# Patient Record
Sex: MALE | Race: OTHER
[De-identification: names, ages, dates, MRNs, and addresses within clinical notes are randomized per-mention and may not be internally consistent; named-entity substitution may affect disease eponyms.]

---

## 2018-09-08 ENCOUNTER — HOSPITAL ENCOUNTER (INPATIENT)
Dept: HOSPITAL 74 - YASAS | Age: 53
LOS: 5 days | Discharge: HOME | DRG: 773 | End: 2018-09-13
Attending: INTERNAL MEDICINE | Admitting: INTERNAL MEDICINE
Payer: COMMERCIAL

## 2018-09-08 VITALS — BODY MASS INDEX: 18.8 KG/M2

## 2018-09-08 DIAGNOSIS — K08.89: ICD-10-CM

## 2018-09-08 DIAGNOSIS — F10.230: ICD-10-CM

## 2018-09-08 DIAGNOSIS — F11.23: Primary | ICD-10-CM

## 2018-09-08 DIAGNOSIS — Z91.013: ICD-10-CM

## 2018-09-08 DIAGNOSIS — R63.6: ICD-10-CM

## 2018-09-08 DIAGNOSIS — F17.213: ICD-10-CM

## 2018-09-08 DIAGNOSIS — F19.282: ICD-10-CM

## 2018-09-08 LAB
APPEARANCE UR: CLEAR
BILIRUB UR STRIP.AUTO-MCNC: NEGATIVE MG/DL
COLOR UR: YELLOW
EPITH CASTS URNS QL MICRO: (no result) /HPF
KETONES UR QL STRIP: NEGATIVE
LEUKOCYTE ESTERASE UR QL STRIP.AUTO: NEGATIVE
MUCOUS THREADS URNS QL MICRO: (no result)
NITRITE UR QL STRIP: NEGATIVE
PH UR: 5 [PH] (ref 5–8)
PROT UR QL STRIP: NEGATIVE
PROT UR QL STRIP: NEGATIVE
SP GR UR: 1.02 (ref 1–1.03)
UROBILINOGEN UR STRIP-MCNC: NEGATIVE MG/DL (ref 0.2–1)

## 2018-09-08 PROCEDURE — HZ2ZZZZ DETOXIFICATION SERVICES FOR SUBSTANCE ABUSE TREATMENT: ICD-10-PCS | Performed by: INTERNAL MEDICINE

## 2018-09-08 RX ADMIN — Medication SCH MG: at 22:46

## 2018-09-08 NOTE — HP
COWS





- Scale


Resting Pulse: 0= [place] 80 or Below


Sweatin= Chills/Flushing


Restless Observation: 0= Sits Still


Pupil Size: 0= Normal to Room Light


Bone or Joint Aches: 1= Mild Discomfort


Runny Nose/ Eye Tearin= Nasal Congestion


GI Upset > 30mins: 1= Stomach Cramp


Tremor Observation: 2= Slight Tremor Visible


Yawning Observation: 1= 1-2x During Session


Anxiety or Irritability: 2=Irritable/Anxious


Goose Flesh Skin: 0=Smooth Skin


COWS Score: 9





CIWA Score





- CIWA Score


Nausea/Vomitin-Mild Nausea/No Vomiting


Muscle Tremors: 4-Moderate,w/Arms Extend


Anxiety: 4-Mod. Anxious/Guarded


Agitation: 1-Slight > Activity


Paroxysmal Sweats: 1-Minimal Palms Moist


Orientation: 1-Uncertain about Date


Tacttile Disturbances: 1-Very Mild Itch/Numbness


Auditory Disturbances: 1-Very Mild


Visual Disturbances: 1-Very Mild Sensitivity


Headache: 2-Mild


CIWA-Ar Total Score: 17





Admission ROS S





- HPI


Chief Complaint: 


I do too much drugs, I get too sick, I can't stop


Allergies/Adverse Reactions: 


 Allergies











Allergy/AdvReac Type Severity Reaction Status Date / Time


 


Fish Containing Products Allergy Mild Hives Verified 18 10:35











History of Present Illness: 


54 yo gentleman here for detox from opiates and alcohol.  Last time here in 

.  Denies seizures or black outs.  


Exam Limitations: Clinical Condition





- Ebola screening


Have you traveled outside of the country in the last 21 days: No (N)


Have you had contact with anyone from an Ebola affected area: No


Do you have a fever: No





- Review of Systems


Constitutional: Loss of Appetite, Malaise, Changes in sleep, Weakness


EENT: reports: Nose Congestion


Respiratory: reports: No Symptoms reported


Cardiac: reports: No Symptoms Reported


GI: reports: Poor Appetite, Poor Fluid Intake, Indigestion, Abdominal cramping


: reports: Frequency


Musculoskeletal: reports: Back Pain, Muscle Pain


Integumentary: reports: No Symptoms Reported


Neuro: reports: Headache, Tremors


Endocrine: reports: No Symptoms Reported


Hematology: reports: No Symptoms Reported


Psychiatric: reports: Judgement Intact, Mood/Affect Appropiate, Anxious


Other Systems: Reviewed and Negative





Patient History





- Patient Medical History


Hx Asthma: No


Hx Chronic Obstructive Pulmonary Disease (COPD): No


Hx Cancer: No


Hx Congestive Heart Failure: No


Hx Hypertension: No


Hx Hypercholesterolemia: No


Hx Pacemaker: No


Hx Seizures: No


Hx Diabetes: No


Hx Gastrointestinal Disorders: No


Hx Liver Disease: No


Hx Genitourinary Disorders: No


Hx Sexually Transmitted Disorders: No


Hx Renal Disease (ESRD): No


Hx Thyroid Disease: No


Hx Human Immunodeficiency Virus (HIV): No


Hx Hepatitis C: No


Hx Depression: No


Hx Suicide Attempt: No


Hx Bipolar Disorder: No


Hx Schizophrenia: No





- Patient Surgical History


Past Surgical History: No





- PPD History


Previous Implant?: Yes


Documented Results: Negative w/o proof


Implanted On Prior [facility] Admission?: No


PPD to be Administered?: Yes





- Reproductive History


Patient is a Female of Child Bearing Age (11 -55 yrs old): No (male)





- Smoking Cessation


Smoking history: Current every day smoker


Have you smoked in the past 12 months: Yes


Aproximately how many cigarettes per day: 20


Initiated information on smoking cessation: Yes


'Breaking Loose' booklet given: 18





- Substance & Tx. History


Hx Alcohol Use: Yes


Hx Substance Use: Yes


Substance Use Type: Alcohol, Heroin


Hx Substance Use Treatment: Yes (detox)





- Substances Abused


  ** Heroin


Route: Inhalation


Frequency: Daily


Amount used: 6 bags


Age of first use: 15


Date of Last Use: 18





  ** Alcohol


Route: Oral


Frequency: Daily


Amount used: Beer - four 12 oz; 1/2 pint vodka


Age of first use: 15


Date of Last Use: 18





Family Disease History





- Family Disease History


Family Disease History: Heart Disease: Mother (), Other: Father (

 in [place]), Mother, Brother (13 brothers - in [place] - no contact), Sister (

four sisters - in [place] - no contact)





Admission Physical Exam [facility]





- Vital Signs


Vital Signs: 


123/76  P 59  R 18  T 96.2








- Physical


General Appearance: Yes: Appropriately Dressed, Moderate Distress, Thin, Anxious

, Other (poor dentition)


HEENTM: Yes: EOMI, Hearing grossly Normal, Normocephalic, Normal Voice, Pharynx 

Normal, Nasal Congestion


Respiratory: Yes: Normal Breath Sounds, No Respiratory Distress


Neck: Yes: No masses,lesions,Nodules, Supple


Breast: Yes: Breast Exam Deferred


Cardiology: Yes: Regular Rhythm, Bradycardia


Abdominal: Yes: Flat, Soft


Genitourinary: Yes: Within Normal Limits


Back: Yes: Normal Inspection


Musculoskeletal: Yes: full range of Motion, Gait Steady, Back pain, Muscle Pain


Extremities: Yes: Normal Inspection, Normal Range of Motion, Non-Tender


Neurological: Yes: Alert, Motor Strength 5/5, Normal Mood/Affect, Normal 

Response


Integumentary: Yes: Normal Color, Dry, Warm


Lymphatic: Yes: Within Normal Limits





- Diagnostic


(1) Alcohol dependence with uncomplicated withdrawal


Current Visit: Yes   Status: Chronic   





(2) Opioid dependence with withdrawal


Current Visit: Yes   Status: Chronic   





(3) Nicotine dependence


Current Visit: Yes   Status: Chronic   


Qualifiers: 


   Nicotine product type: cigarettes 





(4) Poor dentition


Current Visit: Yes   Status: Chronic   





(5) Underweight


Current Visit: Yes   Status: Chronic   





Cleared for Admission S





- Detox or Rehab


[facility] Level of Care: Medically Managed


Detox Regimen/Protocol: Methadone/Librium

## 2018-09-09 LAB
ALBUMIN SERPL-MCNC: 3.2 G/DL (ref 3.4–5)
ALP SERPL-CCNC: 80 U/L (ref 45–117)
ALT SERPL-CCNC: 16 U/L (ref 12–78)
ANION GAP SERPL CALC-SCNC: 11 MMOL/L (ref 8–16)
AST SERPL-CCNC: 19 U/L (ref 15–37)
BILIRUB SERPL-MCNC: 0.6 MG/DL (ref 0.2–1)
BUN SERPL-MCNC: 11 MG/DL (ref 7–18)
CALCIUM SERPL-MCNC: 9.1 MG/DL (ref 8.5–10.1)
CHLORIDE SERPL-SCNC: 107 MMOL/L (ref 98–107)
CO2 SERPL-SCNC: 24 MMOL/L (ref 21–32)
CREAT SERPL-MCNC: 0.8 MG/DL (ref 0.7–1.3)
DEPRECATED RDW RBC AUTO: 15.1 % (ref 11.9–15.9)
GLUCOSE SERPL-MCNC: 104 MG/DL (ref 74–106)
HCT VFR BLD CALC: 43.3 % (ref 35.4–49)
HGB BLD-MCNC: 14 GM/DL (ref 11.7–16.9)
MCH RBC QN AUTO: 29.2 PG (ref 25.7–33.7)
MCHC RBC AUTO-ENTMCNC: 32.5 G/DL (ref 32–35.9)
MCV RBC: 89.8 FL (ref 80–96)
PLATELET # BLD AUTO: 339 K/MM3 (ref 134–434)
PMV BLD: 8 FL (ref 7.5–11.1)
POTASSIUM SERPLBLD-SCNC: 4.4 MMOL/L (ref 3.5–5.1)
PROT SERPL-MCNC: 6.6 G/DL (ref 6.4–8.2)
RBC # BLD AUTO: 4.82 M/MM3 (ref 4–5.6)
SODIUM SERPL-SCNC: 142 MMOL/L (ref 136–145)
WBC # BLD AUTO: 9 K/MM3 (ref 4–10)

## 2018-09-09 RX ADMIN — Medication SCH MG: at 23:16

## 2018-09-09 RX ADMIN — Medication SCH TAB: at 10:20

## 2018-09-09 NOTE — CONSULT
[facility] Psychiatric Consult





- Data


Date of interview: 09/09/18


Admission source: Self-referred


Identifying data: Mr Andrade is a 53 years old single ,, father of a 

26 years old daughter, unemployed on public assistance, homeless seeking detox 

treatment for alcohol and opioid


Substance Abuse History: Reports history of alcohol and heroin use. Refer to 

addiction counselor's t[place] for further information


Medical History: Significant for bronchial asthma. Smokes cigarettes 1 ppd


Psychiatric History: Denies history of previous psychiatric treatment


Physical/Sexual Abuse/Trauma History: Denies history of emotional, physical or 

sexual abuse as well as DV relationship


Additional Comment: Reports history of 5 previous arrests including 3 felony 

convictions. Denies being on parole/probation at present





Mental Status Exam





- Mental Status Exam


Alert and Oriented to: Time, Place, Person


Cognitive Function: Fair


Patient Appearance: Well Groomed


Mood: Hopeful, Euthymic


Patient Behavior: Cooperative


Speech Pattern: Clear


Voice Loudness: Normal


Thought Process: Intact, Goal Oriented


Thought Disorder: Not Present


Hallucinations: Denies


Suicidal Ideation: Denies


Homicidal Ideation: Denies


Insight/Judgement: Poor


Sleep: Poorly


Appetite: Good


Muscle strength/Tone: Normal


Gait/Station: Normal





Psychiatric Findings





- Problem List (Axis 1, 2,3)


(1) Substance-induced sleep disorder


Current Visit: Yes   Status: Acute   





(2) Alcohol dependence with uncomplicated withdrawal


Current Visit: Yes   Status: Acute   





(3) Opioid dependence with withdrawal


Current Visit: Yes   Status: Acute   





(4) Nicotine dependence


Current Visit: Yes   Status: Chronic   


Qualifiers: 


   Nicotine product type: cigarettes 





- Initial Treatment Plan


Initial Treatment Plan: 1) Start Ambien 10 mg po HS prn for insomnia.  2) 

Continue inpatient detoxification

## 2018-09-09 NOTE — PN
[facility] CIWA





- CIWA Score


Nausea/Vomitin-No Nausea/No Vomiting


Muscle Tremors: 4-Moderate,w/Arms Extend


Anxiety: 4-Mod. Anxious/Guarded


Agitation: 3


Paroxysmal Sweats: 1-Minimal Palms Moist


Orientation: 1-Uncertain about Date


Tacttile Disturbances: 0-None


Auditory Disturbances: 0-None


Visual Disturbances: 0-None


Headache: 0-None Present


CIWA-Ar Total Score: 13





[facility] COWS





- Scale


Resting Pulse: 0= [place] 80 or Below


Sweatin= Chills/Flushing


Restless Observation: 1= Difficult to Sit Still


Pupil Size: 0= Normal to Room Light


Bone or Joint Aches: 2= Severe Diffuse Aches


Runny Nose/ Eye Tearin= Runny Nose/Eyes


GI Upset > 30mins: 2= Nausea/Diarrhea


Tremor Observation of Outstretched Hands: 2= Slight Tremor Visible


Yawning Observation: 1= 1-2x During Session


Anxiety or Irritability: 2=Irritable/Anxious


Goose Flesh Skin: 0=Smooth Skin


COWS Score: 13





[facility] Progress Note (SOAP)


Subjective: 





muscle cramping body ache headache sweat tremor anxiety restlessness


Objective: 





18 16:06


 Vital Signs











Temperature  97.8 F   18 15:22


 


Pulse Rate  73   18 15:22


 


Respiratory Rate  18   18 15:22


 


Blood Pressure  116/53   18 15:22


 


O2 Sat by Pulse Oximetry (%)      








 Laboratory Last Values











WBC  9.0 K/mm3 (4.0-10.0)   18  08:00    


 


RBC  4.82 M/mm3 (4.00-5.60)   18  08:00    


 


Hgb  14.0 GM/dL (11.7-16.9)   18  08:00    


 


Hct  43.3 % (35.4-49)   18  08:00    


 


MCV  89.8 fl (80-96)   18  08:00    


 


MCH  29.2 pg (25.7-33.7)   18  08:00    


 


MCHC  32.5 g/dl (32.0-35.9)   18  08:00    


 


RDW  15.1 % (11.9-15.9)   18  08:00    


 


Plt Count  339 K/MM3 (134-434)   18  08:00    


 


MPV  8.0 fl (7.5-11.1)   18  08:00    


 


Sodium  142 mmol/L (136-145)   18  08:00    


 


Potassium  4.4 mmol/L (3.5-5.1)   18  08:00    


 


Chloride  107 mmol/L ()   18  08:00    


 


Carbon Dioxide  24 mmol/L (21-32)   18  08:00    


 


Anion Gap  11 MMOL/L (8-16)   18  08:00    


 


BUN  11 mg/dL (7-18)   18  08:00    


 


Creatinine  0.8 mg/dL (0.7-1.3)   18  08:00    


 


Creat Clearance w eGFR  > 60  (>60)   18  08:00    


 


Random Glucose  104 mg/dL ()   18  08:00    


 


Calcium  9.1 mg/dL (8.5-10.1)   18  08:00    


 


Total Bilirubin  0.6 mg/dL (0.2-1.0)   18  08:00    


 


AST  19 U/L (15-37)   18  08:00    


 


ALT  16 U/L (12-78)   18  08:00    


 


Alkaline Phosphatase  80 U/L ()   18  08:00    


 


Total Protein  6.6 g/dl (6.4-8.2)   18  08:00    


 


Albumin  3.2 g/dl (3.4-5.0)  L  18  08:00    


 


Urine Color  Yellow   18  14:03    


 


Urine Appearance  Clear   18  14:03    


 


Urine pH  5.0  (5.0-8.0)   18  14:03    


 


Ur Specific Gravity  1.018  (1.001-1.035)   18  14:03    


 


Urine Protein  Negative  (NEGATIVE)   18  14:03    


 


Urine Glucose (UA)  Negative  (NEGATIVE)   18  14:03    


 


Urine Ketones  Negative  (NEGATIVE)   18  14:03    


 


Urine Blood  1+  (NEGATIVE)  H  18  14:03    


 


Urine Nitrite  Negative  (NEGATIVE)   18  14:03    


 


Urine Bilirubin  Negative  (<2.0 mg/dL)   18  14:03    


 


Urine Urobilinogen  Negative mg/dL (0.2-1.0)   18  14:03    


 


Ur Leukocyte Esterase  Negative  (NEGATIVE)   18  14:03    


 


Urine WBC (Auto)  2 /hpf (3-5)   18  14:03    


 


Urine RBC (Auto)  2 /hpf (0-3)   18  14:03    


 


Ur Epithelial Cells  Rare /HPF (FEW)   18  14:03    


 


Urine Mucus  Rare   18  14:03    


 


RPR Titer  Nonreactive  (NONREACTIVE)   18  08:00    


 


HIV 1&2 Antibody Screen  Negative   18  08:00    


 


HIV P24 Antigen  Negative   18  08:00    








lab noted


Assessment: 





18 16:08


withdrawal sx


Plan: 





continue detox

## 2018-09-10 RX ADMIN — Medication SCH TAB: at 10:13

## 2018-09-10 RX ADMIN — METHADONE HYDROCHLORIDE SCH MG: 5 TABLET ORAL at 10:13

## 2018-09-10 RX ADMIN — Medication SCH MG: at 22:08

## 2018-09-10 RX ADMIN — Medication PRN MG: at 22:10

## 2018-09-10 NOTE — PN
[facility] CIWA





- CIWA Score


Nausea/Vomitin-Mild Nausea/No Vomiting


Muscle Tremors: 4-Moderate,w/Arms Extend


Anxiety: 3


Agitation: 3


Paroxysmal Sweats: 1-Minimal Palms Moist


Orientation: 0-Oriented


Tacttile Disturbances: 0-None


Auditory Disturbances: 0-None


Visual Disturbances: 0-None


Headache: 0-None Present


CIWA-Ar Total Score: 12





[facility] COWS





- Scale


Resting Pulse: 0= [place] 80 or Below


Sweatin= Chills/Flushing


Restless Observation: 1= Difficult to Sit Still


Pupil Size: 0= Normal to Room Light


Bone or Joint Aches: 2= Severe Diffuse Aches


Runny Nose/ Eye Tearin= Nasal Congestion


GI Upset > 30mins: 2= Nausea/Diarrhea


Tremor Observation of Outstretched Hands: 2= Slight Tremor Visible


Yawning Observation: 1= 1-2x During Session


Anxiety or Irritability: 2=Irritable/Anxious


Goose Flesh Skin: 0=Smooth Skin


COWS Score: 12





[facility] Progress Note (SOAP)


Subjective: 





body ache muscle cramping sweat tremor irritable anxiety


Objective: 





09/10/18 11:47


 Vital Signs











Temperature  98.4 F   09/10/18 09:30


 


Pulse Rate  67   09/10/18 09:30


 


Respiratory Rate  16   09/10/18 09:30


 


Blood Pressure  129/94   09/10/18 09:30


 


O2 Sat by Pulse Oximetry (%)      








 Laboratory Last Values











WBC  9.0 K/mm3 (4.0-10.0)   18  08:00    


 


RBC  4.82 M/mm3 (4.00-5.60)   18  08:00    


 


Hgb  14.0 GM/dL (11.7-16.9)   18  08:00    


 


Hct  43.3 % (35.4-49)   18  08:00    


 


MCV  89.8 fl (80-96)   18  08:00    


 


MCH  29.2 pg (25.7-33.7)   18  08:00    


 


MCHC  32.5 g/dl (32.0-35.9)   18  08:00    


 


RDW  15.1 % (11.9-15.9)   18  08:00    


 


Plt Count  339 K/MM3 (134-434)   18  08:00    


 


MPV  8.0 fl (7.5-11.1)   18  08:00    


 


Sodium  142 mmol/L (136-145)   18  08:00    


 


Potassium  4.4 mmol/L (3.5-5.1)   18  08:00    


 


Chloride  107 mmol/L ()   18  08:00    


 


Carbon Dioxide  24 mmol/L (21-32)   18  08:00    


 


Anion Gap  11 MMOL/L (8-16)   18  08:00    


 


BUN  11 mg/dL (7-18)   18  08:00    


 


Creatinine  0.8 mg/dL (0.7-1.3)   18  08:00    


 


Creat Clearance w eGFR  > 60  (>60)   18  08:00    


 


Random Glucose  104 mg/dL ()   18  08:00    


 


Calcium  9.1 mg/dL (8.5-10.1)   18  08:00    


 


Total Bilirubin  0.6 mg/dL (0.2-1.0)   18  08:00    


 


AST  19 U/L (15-37)   18  08:00    


 


ALT  16 U/L (12-78)   18  08:00    


 


Alkaline Phosphatase  80 U/L ()   18  08:00    


 


Total Protein  6.6 g/dl (6.4-8.2)   18  08:00    


 


Albumin  3.2 g/dl (3.4-5.0)  L  18  08:00    


 


Urine Color  Yellow   18  14:03    


 


Urine Appearance  Clear   18  14:03    


 


Urine pH  5.0  (5.0-8.0)   18  14:03    


 


Ur Specific Gravity  1.018  (1.001-1.035)   18  14:03    


 


Urine Protein  Negative  (NEGATIVE)   18  14:03    


 


Urine Glucose (UA)  Negative  (NEGATIVE)   18  14:03    


 


Urine Ketones  Negative  (NEGATIVE)   18  14:03    


 


Urine Blood  1+  (NEGATIVE)  H  18  14:03    


 


Urine Nitrite  Negative  (NEGATIVE)   18  14:03    


 


Urine Bilirubin  Negative  (<2.0 mg/dL)   18  14:03    


 


Urine Urobilinogen  Negative mg/dL (0.2-1.0)   18  14:03    


 


Ur Leukocyte Esterase  Negative  (NEGATIVE)   18  14:03    


 


Urine WBC (Auto)  2 /hpf (3-5)   18  14:03    


 


Urine RBC (Auto)  2 /hpf (0-3)   18  14:03    


 


Ur Epithelial Cells  Rare /HPF (FEW)   18  14:03    


 


Urine Mucus  Rare   18  14:03    


 


RPR Titer  Nonreactive  (NONREACTIVE)   18  08:00    


 


HIV 1&2 Antibody Screen  Negative   18  08:00    


 


HIV P24 Antigen  Negative   18  08:00    








lab noted


Assessment: 





09/10/18 11:47


withdrawal sx


Plan: 





continue detox

## 2018-09-10 NOTE — EKG
Test Reason : 

Blood Pressure : ***/*** mmHG

Vent. Rate : 063 BPM     Atrial Rate : 063 BPM

   P-R Int : 156 ms          QRS Dur : 092 ms

    QT Int : 394 ms       P-R-T Axes : 067 040 042 degrees

   QTc Int : 403 ms

 

NORMAL SINUS RHYTHM

NORMAL ECG

NO PREVIOUS ECGS AVAILABLE

Confirmed by ERI CARABALLO MD ([id number]) on 9/10/2018 11:24:31 AM

 

Referred By:             Confirmed By:ERI CARABALLO MD

## 2018-09-11 RX ADMIN — Medication SCH TAB: at 10:45

## 2018-09-11 RX ADMIN — Medication SCH MG: at 22:34

## 2018-09-11 RX ADMIN — Medication PRN MG: at 22:34

## 2018-09-11 RX ADMIN — METHADONE HYDROCHLORIDE SCH MG: 5 TABLET ORAL at 10:45

## 2018-09-11 NOTE — PN
[facility] Progress Note (SOAP)


Subjective: 





sweat tremor body aches restlessness anxiety


Objective: 





09/11/18 14:12


 Vital Signs











Temperature  97.2 F L  09/11/18 13:19


 


Pulse Rate  82   09/11/18 13:19


 


Respiratory Rate  18   09/11/18 13:19


 


Blood Pressure  118/63   09/11/18 13:19


 


O2 Sat by Pulse Oximetry (%)      








 Laboratory Last Values











WBC  9.0 K/mm3 (4.0-10.0)   09/09/18  08:00    


 


RBC  4.82 M/mm3 (4.00-5.60)   09/09/18  08:00    


 


Hgb  14.0 GM/dL (11.7-16.9)   09/09/18  08:00    


 


Hct  43.3 % (35.4-49)   09/09/18  08:00    


 


MCV  89.8 fl (80-96)   09/09/18  08:00    


 


MCH  29.2 pg (25.7-33.7)   09/09/18  08:00    


 


MCHC  32.5 g/dl (32.0-35.9)   09/09/18  08:00    


 


RDW  15.1 % (11.9-15.9)   09/09/18  08:00    


 


Plt Count  339 K/MM3 (134-434)   09/09/18  08:00    


 


MPV  8.0 fl (7.5-11.1)   09/09/18  08:00    


 


Sodium  142 mmol/L (136-145)   09/09/18  08:00    


 


Potassium  4.4 mmol/L (3.5-5.1)   09/09/18  08:00    


 


Chloride  107 mmol/L ()   09/09/18  08:00    


 


Carbon Dioxide  24 mmol/L (21-32)   09/09/18  08:00    


 


Anion Gap  11 MMOL/L (8-16)   09/09/18  08:00    


 


BUN  11 mg/dL (7-18)   09/09/18  08:00    


 


Creatinine  0.8 mg/dL (0.7-1.3)   09/09/18  08:00    


 


Creat Clearance w eGFR  > 60  (>60)   09/09/18  08:00    


 


Random Glucose  104 mg/dL ()   09/09/18  08:00    


 


Calcium  9.1 mg/dL (8.5-10.1)   09/09/18  08:00    


 


Total Bilirubin  0.6 mg/dL (0.2-1.0)   09/09/18  08:00    


 


AST  19 U/L (15-37)   09/09/18  08:00    


 


ALT  16 U/L (12-78)   09/09/18  08:00    


 


Alkaline Phosphatase  80 U/L ()   09/09/18  08:00    


 


Total Protein  6.6 g/dl (6.4-8.2)   09/09/18  08:00    


 


Albumin  3.2 g/dl (3.4-5.0)  L  09/09/18  08:00    


 


Urine Color  Yellow   09/08/18  14:03    


 


Urine Appearance  Clear   09/08/18  14:03    


 


Urine pH  5.0  (5.0-8.0)   09/08/18  14:03    


 


Ur Specific Gravity  1.018  (1.001-1.035)   09/08/18  14:03    


 


Urine Protein  Negative  (NEGATIVE)   09/08/18  14:03    


 


Urine Glucose (UA)  Negative  (NEGATIVE)   09/08/18  14:03    


 


Urine Ketones  Negative  (NEGATIVE)   09/08/18  14:03    


 


Urine Blood  1+  (NEGATIVE)  H  09/08/18  14:03    


 


Urine Nitrite  Negative  (NEGATIVE)   09/08/18  14:03    


 


Urine Bilirubin  Negative  (<2.0 mg/dL)   09/08/18  14:03    


 


Urine Urobilinogen  Negative mg/dL (0.2-1.0)   09/08/18  14:03    


 


Ur Leukocyte Esterase  Negative  (NEGATIVE)   09/08/18  14:03    


 


Urine WBC (Auto)  2 /hpf (3-5)   09/08/18  14:03    


 


Urine RBC (Auto)  2 /hpf (0-3)   09/08/18  14:03    


 


Ur Epithelial Cells  Rare /HPF (FEW)   09/08/18  14:03    


 


Urine Mucus  Rare   09/08/18  14:03    


 


RPR Titer  Nonreactive  (NONREACTIVE)   09/09/18  08:00    


 


HIV 1&2 Antibody Screen  Negative   09/09/18  08:00    


 


HIV P24 Antigen  Negative   09/09/18  08:00    








lab noted


Assessment: 





09/11/18 14:13


withdrawal sx


Plan: 





continue detox

## 2018-09-12 RX ADMIN — Medication SCH MG: at 22:25

## 2018-09-12 RX ADMIN — Medication SCH TAB: at 10:14

## 2018-09-12 NOTE — PN
[facility] Progress Note (SOAP)


Subjective: 





feeling better no tremor less sweat social with peers in day room

## 2018-09-13 VITALS — TEMPERATURE: 98.2 F | HEART RATE: 90 BPM | SYSTOLIC BLOOD PRESSURE: 118 MMHG | DIASTOLIC BLOOD PRESSURE: 73 MMHG

## 2018-09-13 RX ADMIN — Medication SCH TAB: at 09:29

## 2018-09-13 NOTE — DS
[facility] Detox Discharge Summary


Admission Date: 


09/08/18





Discharge Date: 09/13/18





- History


Present History: Alcohol Dependence, Opioid Dependence


Additional Comments: 





53 years old male admitted on 9/8/18 for alcohol and opiate withdrawal sx


completed detox regimen tolerated well denies alcohol and opiate withdrawal sx


alert oriented x 3 no acute distress aftercare revelation 


patient agrees to return 9/14/16 to [facility] for rehab admission





- Physical Exam Results


Vital Signs: 


 Vital Signs











Temperature  97.7 F   09/13/18 06:00


 


Pulse Rate  78   09/13/18 06:00


 


Respiratory Rate  18   09/13/18 06:00


 


Blood Pressure  129/83   09/13/18 06:00


 


O2 Sat by Pulse Oximetry (%)      











Pertinent Admission Physical Exam Findings: 





alcohol and opiate withdrawal sx


 Vital Signs











Temperature  98.2 F   09/13/18 09:08


 


Pulse Rate  90   09/13/18 09:08


 


Respiratory Rate  18   09/13/18 09:08


 


Blood Pressure  118/73   09/13/18 09:08


 


O2 Sat by Pulse Oximetry (%)      








 Laboratory Last Values











WBC  9.0 K/mm3 (4.0-10.0)   09/09/18  08:00    


 


RBC  4.82 M/mm3 (4.00-5.60)   09/09/18  08:00    


 


Hgb  14.0 GM/dL (11.7-16.9)   09/09/18  08:00    


 


Hct  43.3 % (35.4-49)   09/09/18  08:00    


 


MCV  89.8 fl (80-96)   09/09/18  08:00    


 


MCH  29.2 pg (25.7-33.7)   09/09/18  08:00    


 


MCHC  32.5 g/dl (32.0-35.9)   09/09/18  08:00    


 


RDW  15.1 % (11.9-15.9)   09/09/18  08:00    


 


Plt Count  339 K/MM3 (134-434)   09/09/18  08:00    


 


MPV  8.0 fl (7.5-11.1)   09/09/18  08:00    


 


Sodium  142 mmol/L (136-145)   09/09/18  08:00    


 


Potassium  4.4 mmol/L (3.5-5.1)   09/09/18  08:00    


 


Chloride  107 mmol/L ()   09/09/18  08:00    


 


Carbon Dioxide  24 mmol/L (21-32)   09/09/18  08:00    


 


Anion Gap  11 MMOL/L (8-16)   09/09/18  08:00    


 


BUN  11 mg/dL (7-18)   09/09/18  08:00    


 


Creatinine  0.8 mg/dL (0.7-1.3)   09/09/18  08:00    


 


Creat Clearance w eGFR  > 60  (>60)   09/09/18  08:00    


 


Random Glucose  104 mg/dL ()   09/09/18  08:00    


 


Calcium  9.1 mg/dL (8.5-10.1)   09/09/18  08:00    


 


Total Bilirubin  0.6 mg/dL (0.2-1.0)   09/09/18  08:00    


 


AST  19 U/L (15-37)   09/09/18  08:00    


 


ALT  16 U/L (12-78)   09/09/18  08:00    


 


Alkaline Phosphatase  80 U/L ()   09/09/18  08:00    


 


Total Protein  6.6 g/dl (6.4-8.2)   09/09/18  08:00    


 


Albumin  3.2 g/dl (3.4-5.0)  L  09/09/18  08:00    


 


Urine Color  Yellow   09/08/18  14:03    


 


Urine Appearance  Clear   09/08/18  14:03    


 


Urine pH  5.0  (5.0-8.0)   09/08/18  14:03    


 


Ur Specific Gravity  1.018  (1.001-1.035)   09/08/18  14:03    


 


Urine Protein  Negative  (NEGATIVE)   09/08/18  14:03    


 


Urine Glucose (UA)  Negative  (NEGATIVE)   09/08/18  14:03    


 


Urine Ketones  Negative  (NEGATIVE)   09/08/18  14:03    


 


Urine Blood  1+  (NEGATIVE)  H  09/08/18  14:03    


 


Urine Nitrite  Negative  (NEGATIVE)   09/08/18  14:03    


 


Urine Bilirubin  Negative  (<2.0 mg/dL)   09/08/18  14:03    


 


Urine Urobilinogen  Negative mg/dL (0.2-1.0)   09/08/18  14:03    


 


Ur Leukocyte Esterase  Negative  (NEGATIVE)   09/08/18  14:03    


 


Urine WBC (Auto)  2 /hpf (3-5)   09/08/18  14:03    


 


Urine RBC (Auto)  2 /hpf (0-3)   09/08/18  14:03    


 


Ur Epithelial Cells  Rare /HPF (FEW)   09/08/18  14:03    


 


Urine Mucus  Rare   09/08/18  14:03    


 


RPR Titer  Nonreactive  (NONREACTIVE)   09/09/18  08:00    


 


HIV 1&2 Antibody Screen  Negative   09/09/18  08:00    


 


HIV P24 Antigen  Negative   09/09/18  08:00    








lab noted





- Treatment


Hospital Course: Detox Protocol Followed, Detoxed Safely, Responded well, 

Discharged Condition Good, Rehab Referral Accepted


Patient has Accepted a [facility]hab Referral [facility]: [facility] 





- Medication


Discharge Medications: 


Ambulatory Orders





NK [No Known Home Medication]  09/08/18 











- Diagnosis


(1) Alcohol dependence with uncomplicated withdrawal


Current Visit: Yes   Status: Acute   





(2) Opioid dependence with withdrawal


Current Visit: Yes   Status: Acute   





(3) Substance-induced sleep disorder


Current Visit: Yes   Status: Suspected   





(4) Nicotine dependence


Current Visit: Yes   Status: Acute   


Qualifiers: 


   Nicotine product type: cigarettes   Substance use status: in withdrawal   

Qualified Code(s): F17.213 - Nicotine dependence, cigarettes, with withdrawal   





(5) Underweight


Current Visit: Yes   Status: Acute   





- AMA


Did Patient Leave Against Medical Advice: No

## 2018-11-16 ENCOUNTER — HOSPITAL ENCOUNTER (INPATIENT)
Dept: HOSPITAL 74 - YASAS | Age: 53
LOS: 4 days | Discharge: LEFT BEFORE DISCHARGE | DRG: 770 | End: 2018-11-20
Attending: INTERNAL MEDICINE | Admitting: INTERNAL MEDICINE
Payer: COMMERCIAL

## 2018-11-16 VITALS — BODY MASS INDEX: 19.4 KG/M2

## 2018-11-16 DIAGNOSIS — F11.23: Primary | ICD-10-CM

## 2018-11-16 DIAGNOSIS — K08.9: ICD-10-CM

## 2018-11-16 DIAGNOSIS — R63.6: ICD-10-CM

## 2018-11-16 DIAGNOSIS — F17.213: ICD-10-CM

## 2018-11-16 DIAGNOSIS — K08.89: ICD-10-CM

## 2018-11-16 DIAGNOSIS — F10.230: ICD-10-CM

## 2018-11-17 PROCEDURE — HZ2ZZZZ DETOXIFICATION SERVICES FOR SUBSTANCE ABUSE TREATMENT: ICD-10-PCS | Performed by: INTERNAL MEDICINE

## 2018-11-17 RX ADMIN — Medication SCH MG: at 22:35

## 2018-11-17 RX ADMIN — NICOTINE SCH: 21 PATCH TRANSDERMAL at 10:40

## 2018-11-17 RX ADMIN — Medication SCH TAB: at 10:38

## 2018-11-17 NOTE — EKG
Test Reason : 

Blood Pressure : ***/*** mmHG

Vent. Rate : 063 BPM     Atrial Rate : 063 BPM

   P-R Int : 152 ms          QRS Dur : 080 ms

    QT Int : 394 ms       P-R-T Axes : [phone] degrees

   QTc Int : 403 ms

 

NORMAL SINUS RHYTHM

NORMAL ECG

WHEN COMPARED WITH ECG OF 08-SEP-2018 13:36,

NO SIGNIFICANT CHANGE WAS FOUND

Confirmed by Rodger Medellin ([id number]) on 11/17/2018 3:21:08 PM

 

Referred By:             Confirmed By:Rodger Medellin

## 2018-11-17 NOTE — PN
[facility] Progress Note


Note: 


Patient admitted early this morning for alcohol and heroin detox. He is alert 

with no apparent distress, he denies pain, detox in progress.

## 2018-11-17 NOTE — HP
COWS





- Scale


Resting Pulse: 0= [place] 80 or Below


Sweatin=Flushed/Facial Moisture


Restless Observation: 0= Sits Still


Pupil Size: 2= Moderately Dilated


Bone or Joint Aches: 0= None


Runny Nose/ Eye Tearin= Nasal Congestion


GI Upset > 30mins: 0= None


Tremor Observation: 4= Gross Tremor/Twitching (when arms elevated)


Yawning Observation: 0= None


Anxiety or Irritability: 1=Feels Anxious/Irritable


Goose Flesh Skin: 0=Smooth Skin


COWS Score: 10





CIWA Score


Nausea/Vomitin-No Nausea/No Vomiting


Muscle Tremors: 4-Moderate,w/Arms Extend


Anxiety: 1-Mildly Anxious


Agitation: 0-Normal Activity


Paroxysmal Sweats: 3 (Facial moisture w/o beads)


Orientation: 0-Oriented


Tacttile Disturbances: 0-None


Auditory Disturbances: 0-None


Visual Disturbances: 0-None


Headache: 0-None Present


CIWA-Ar Total Score: 8





- Admission Criteria


[facility] Guidelines: Admission for Medically Managed Detox: 


Requires at least one of the followin. CIWA greater than 12


2. Seizures within the past 24 hours


3. Delirium tremens within the past 24 hours


4. Hallucinations within the past 24 hours


5. Acute intervention needed for co  occurring medical disorder


6. Acute intervention needed for co  occurring psychiatric disorder


7. Severe withdrawal that cannot be handled at a lower level of care (continued


    vomiting, continued diarrhea, abnormal vital signs) requiring intravenous


    medication and/or fluids


8. Pregnancy








Admission [facility]


Chief Complaint: 





Here for heroin and alcohol.


Allergies/Adverse Reactions: 


 Allergies











Allergy/AdvReac Type Severity Reaction Status Date / Time


 


Fish Containing Products Allergy Mild Hives Verified 18 00:19











History of Present Illness: 


Heroin use disorder since age 16.


Alcohol use since age 23.


Nicotine use since age 15.





Denies methadone, benzo, or barbiturate use. 





Denies seizures, blackouts, overdose.





Longest length of sobriety 1 year.





Denies significant PMH/PSH





Search Terms: Paulo Andrade, 1965 


Search Date: 2018 12:15:51 AM 


The Drug Utilization Report below displays all of the controlled substance 

prescriptions, if any, that your patient has filled in the last twelve months. 

The information displayed on this report is compiled from pharmacy submissions 

to the Department, and accurately reflects the information as submitted by the 

pharmacies.


This report was requested by: Sydney Carter | Reference #: [id number] 


There are no results for the search terms that you entered.


Exam Limitations: No Limitations





- Ebola screening


Have you traveled outside of the country in the last 21 days: No


Have you had contact with anyone from an Ebola affected area: No


Have you been sick,other than usual withdrawal symptoms: No


Do you have a fever: No





- Review of Systems


Constitutional: Diaphoresis, Changes in sleep (Difficulty staying asleep)


EENT: reports: Blurred Vision, Dental Problems (Missing teeth. Chews and 

swallows ok)


Respiratory: reports: No Symptoms reported, Other


Cardiac: reports: No Symptoms Reported


GI: reports: No Symptoms Reported


: reports: No Symptoms Reported


Musculoskeletal: reports: No Symptoms Reported


Integumentary: reports: No Symptoms Reported


Neuro: reports: Tremors


Endocrine: reports: No Symptoms Reported


Hematology: reports: No Symptoms Reported


Psychiatric: reports: Orientated x3, Anxious





Patient History





- Patient Medical History


Hx Asthma: No


Hx Chronic Obstructive Pulmonary Disease (COPD): No


Hx Cancer: No


Hx Congestive Heart Failure: No


Hx Hypertension: No


Hx Hypercholesterolemia: No


Hx Pacemaker: No


Hx Seizures: No


Hx Diabetes: No


Hx Gastrointestinal Disorders: No


Hx Liver Disease: No


Hx Genitourinary Disorders: No


Hx Sexually Transmitted Disorders: No


Hx Renal Disease (ESRD): No


Hx Thyroid Disease: No


Hx Human Immunodeficiency Virus (HIV): No


Hx Hepatitis C: No


Hx Depression: No


Hx Suicide Attempt: No


Hx Bipolar Disorder: No


Hx Schizophrenia: No





- Patient Surgical History


Past Surgical History: No





- PPD History


Previous Implant?: Yes


Documented Results: Negative w/o proof


Implanted On Prior [facility] Admission?: Yes


Date: 09/10/18


PPD to be Administered?: No





- Smoking Cessation


Smoking history: Current every day smoker


Have you smoked in the past 12 months: Yes


Aproximately how many cigarettes per day: 20


Hx Chewing Tobacco Use: No


Initiated information on smoking cessation: Yes


'Breaking Loose' booklet given: 18





- Substance & Tx. History


Hx Alcohol Use: Yes


Hx Substance Use: Yes


Substance Use Type: Alcohol, Heroin, Opiates, Tranquilizers


Hx Substance Use Treatment: Yes (detox, rehab)





- Substances Abused


  ** Heroin


Route: Inhalation


Frequency: Daily


Amount used: 6


Age of first use: 16


Date of Last Use: 18





  ** Alcohol


Route: Oral


Amount used: 4-5 22 oz beers


Age of first use: 15


Date of Last Use: 18





Family Disease History





- Family Disease History


Family Disease History: Heart Disease: Mother (), Other: Father (

 in [place]), Mother, Brother (13 brothers - in [place] - no contact), Sister (

four sisters - in [place] - no contact)





Admission Physical Exam [facility]





- Physical


General Appearance: Yes: Mild Distress, Tremorous, Irritable, Sweating, Anxious


HEENTM: Yes: EOMI, PEG (P= 5 mm), Other (Poor dentition)


Respiratory: Yes: Chest Non-Tender, Lungs Clear, Normal Breath Sounds, No 

Respiratory Distress


Neck: Yes: No masses,lesions,Nodules, Supple


Breast: Yes: Breast Exam Deferred


Cardiology: Yes: Regular Rhythm, Regular Rate, S1, S2


Abdominal: Yes: Non Tender, Flat, Soft, Increased Bowel Sounds


Genitourinary: Yes: Within Normal Limits


Back: Yes: Normal Inspection


Musculoskeletal: Yes: full range of Motion, Gait Steady


Extremities: Yes: Normal Capillary Refill, Normal Range of Motion


Neurological: Yes: CNs II-XII NML intact, Fully Oriented, Alert, Motor Strength 

5/5, Normal Mood/Affect, Normal Response


Integumentary: Yes: Normal Color, Dry (Decreased skin turgor), Warm


Lymphatic: Yes: Within Normal Limits





- Diagnostic


(1) Alcohol dependence with uncomplicated withdrawal


Current Visit: Yes   Status: Acute   





(2) Nicotine dependence


Current Visit: Yes   Status: Chronic   


Qualifiers: 


   Nicotine product type: cigarettes   Substance use status: in withdrawal   

Qualified Code(s): F17.213 - Nicotine dependence, cigarettes, with withdrawal   





(3) Opioid dependence with withdrawal


Current Visit: Yes   Status: Acute   





(4) Poor dentition


Current Visit: Yes   Status: Chronic   





Cleared for Admission S





- Detox or Rehab


S Level of Care: Medically Supervised


Detox Regimen/Protocol: Methadone/Librium





[facility] Breath Alcohol Content


Breath Alcohol Content: 0





Vital Signs





- Vital Signs


Vital Signs Refused: No


Temperature: 96.0 F


Temperature Source: Oral


Pulse Rate: 72


Respiratory Rate: 18


Blood Pressure: 116/70


BP Location: Left Arm


Blood Pressure Position: Sitting





- Height


Height: 5 ft 7 in





- Weight


Weight: 124 lb


Weight Measurement Method: Standing Scale


Body Mass Index (BMI): 19.4





- Bowel Function


Bowel Movement: No





Urine Drug Screen





- Test Device


Lot Number: [id number]


Expiration Date: 20





- Control


Is Test Valid: Yes





- Results


Drug Screen Negative: No


Urine Drug Screen Results: OPI-Opiates, BAR-Barbiturates, BZO-Benzodiazepines, 

MTD-Methadone, OXY-Oxycodone

## 2018-11-18 LAB
ALBUMIN SERPL-MCNC: 3 G/DL (ref 3.4–5)
ALP SERPL-CCNC: 68 U/L (ref 45–117)
ALT SERPL-CCNC: 16 U/L (ref 13–61)
ANION GAP SERPL CALC-SCNC: 8 MMOL/L (ref 8–16)
AST SERPL-CCNC: 16 U/L (ref 15–37)
BILIRUB SERPL-MCNC: 0.2 MG/DL (ref 0.2–1)
BUN SERPL-MCNC: 16 MG/DL (ref 7–18)
CALCIUM SERPL-MCNC: 8.4 MG/DL (ref 8.5–10.1)
CHLORIDE SERPL-SCNC: 108 MMOL/L (ref 98–107)
CO2 SERPL-SCNC: 26 MMOL/L (ref 21–32)
CREAT SERPL-MCNC: 0.8 MG/DL (ref 0.55–1.3)
DEPRECATED RDW RBC AUTO: 15.1 % (ref 11.9–15.9)
GLUCOSE SERPL-MCNC: 84 MG/DL (ref 74–106)
HCT VFR BLD CALC: 43.4 % (ref 35.4–49)
HGB BLD-MCNC: 14 GM/DL (ref 11.7–16.9)
MCH RBC QN AUTO: 29.4 PG (ref 25.7–33.7)
MCHC RBC AUTO-ENTMCNC: 32.3 G/DL (ref 32–35.9)
MCV RBC: 91.2 FL (ref 80–96)
PLATELET # BLD AUTO: 333 K/MM3 (ref 134–434)
PMV BLD: 8.3 FL (ref 7.5–11.1)
POTASSIUM SERPLBLD-SCNC: 4.4 MMOL/L (ref 3.5–5.1)
PROT SERPL-MCNC: 6 G/DL (ref 6.4–8.2)
RBC # BLD AUTO: 4.76 M/MM3 (ref 4–5.6)
SODIUM SERPL-SCNC: 141 MMOL/L (ref 136–145)
WBC # BLD AUTO: 6.9 K/MM3 (ref 4–10)

## 2018-11-18 RX ADMIN — Medication SCH TAB: at 10:41

## 2018-11-18 RX ADMIN — Medication SCH MG: at 22:30

## 2018-11-18 RX ADMIN — Medication PRN MG: at 22:31

## 2018-11-18 RX ADMIN — NICOTINE SCH: 21 PATCH TRANSDERMAL at 10:43

## 2018-11-18 NOTE — PN
[facility] CIWA





- CIWA Score


Nausea/Vomitin-No Nausea/No Vomiting


Muscle Tremors: 3


Anxiety: 2


Agitation: 2


Paroxysmal Sweats: 1-Minimal Palms Moist


Orientation: 0-Oriented


Tacttile Disturbances: 1-Very Mild Itch/Numbness


Auditory Disturbances: 1-Very Mild


Visual Disturbances: 0-None


Headache: 1-Very Mild


CIWA-Ar Total Score: 11





BHS COWS





- Scale


Resting Pulse: 1= [place] 


Sweatin= Chills/Flushing


Restless Observation: 1= Difficult to Sit Still


Pupil Size: 0= Normal to Room Light


Bone or Joint Aches: 2= Severe Diffuse Aches


Runny Nose/ Eye Tearin= Nasal Congestion


GI Upset > 30mins: 1= Stomach Cramp


Tremor Observation of Outstretched Hands: 2= Slight Tremor Visible


Yawning Observation: 1= 1-2x During Session


Anxiety or Irritability: 1=Feels Anxious/Irritable


Goose Flesh Skin: 0=Smooth Skin


COWS Score: 11





BHS Progress Note (SOAP)


Subjective: 





body aches muscle cramp sweat tremor restlessness irritable 


Objective: 





18 15:36


 Vital Signs











Temperature  98.2 F   18 13:52


 


Pulse Rate  89   18 13:52


 


Respiratory Rate  18   18 13:52


 


Blood Pressure  121/61   18 13:52


 


O2 Sat by Pulse Oximetry (%)      








 Laboratory Last Values











WBC  6.9 K/mm3 (4.0-10.0)   18  07:40    


 


RBC  4.76 M/mm3 (4.00-5.60)   18  07:40    


 


Hgb  14.0 GM/dL (11.7-16.9)   18  07:40    


 


Hct  43.4 % (35.4-49)   18  07:40    


 


MCV  91.2 fl (80-96)   18  07:40    


 


MCH  29.4 pg (25.7-33.7)   18  07:40    


 


MCHC  32.3 g/dl (32.0-35.9)   18  07:40    


 


RDW  15.1 % (11.9-15.9)   18  07:40    


 


Plt Count  333 K/MM3 (134-434)   18  07:40    


 


MPV  8.3 fl (7.5-11.1)   18  07:40    


 


Sodium  141 mmol/L (136-145)   18  07:40    


 


Potassium  4.4 mmol/L (3.5-5.1)   18  07:40    


 


Chloride  108 mmol/L ()  H  18  07:40    


 


Carbon Dioxide  26 mmol/L (21-32)   18  07:40    


 


Anion Gap  8 MMOL/L (8-16)   18  07:40    


 


BUN  16 mg/dL (7-18)   18  07:40    


 


Creatinine  0.8 mg/dL (0.55-1.3)   18  07:40    


 


Creat Clearance w eGFR  > 60  (>60)   18  07:40    


 


Random Glucose  84 mg/dL ()   18  07:40    


 


Calcium  8.4 mg/dL (8.5-10.1)  L  18  07:40    


 


Total Bilirubin  0.2 mg/dL (0.2-1)   18  07:40    


 


AST  16 U/L (15-37)   18  07:40    


 


ALT  16 U/L (13-61)   18  07:40    


 


Alkaline Phosphatase  68 U/L ()   18  07:40    


 


Total Protein  6.0 g/dl (6.4-8.2)  L  18  07:40    


 


Albumin  3.0 g/dl (3.4-5.0)  L  18  07:40    


 


RPR Titer  Nonreactive  (NONREACTIVE)   18  07:40    








lab noted


Assessment: 





18 15:37


withdrawal sx


Plan: 





continue detox

## 2018-11-19 LAB
APPEARANCE UR: CLEAR
BILIRUB UR STRIP.AUTO-MCNC: NEGATIVE MG/DL
COLOR UR: COLORLESS
KETONES UR QL STRIP: NEGATIVE
LEUKOCYTE ESTERASE UR QL STRIP.AUTO: NEGATIVE
NITRITE UR QL STRIP: NEGATIVE
PH UR: 7 [PH] (ref 5–8)
PROT UR QL STRIP: NEGATIVE
PROT UR QL STRIP: NEGATIVE
SP GR UR: 1 (ref 1.01–1.03)
UROBILINOGEN UR STRIP-MCNC: NEGATIVE MG/DL (ref 0.2–1)

## 2018-11-19 RX ADMIN — Medication SCH TAB: at 10:21

## 2018-11-19 RX ADMIN — NICOTINE SCH: 21 PATCH TRANSDERMAL at 10:20

## 2018-11-19 RX ADMIN — Medication SCH MG: at 22:37

## 2018-11-19 RX ADMIN — Medication PRN MG: at 22:37

## 2018-11-19 NOTE — PN
[facility] CIWA





- CIWA Score


Nausea/Vomitin-No Nausea/No Vomiting


Muscle Tremors: 3


Anxiety: 1-Mildly Anxious


Agitation: 2


Paroxysmal Sweats: 1-Minimal Palms Moist


Orientation: 0-Oriented


Tacttile Disturbances: 1-Very Mild Itch/Numbness


Auditory Disturbances: 1-Very Mild


Visual Disturbances: 0-None


Headache: 1-Very Mild


CIWA-Ar Total Score: 10





BHS COWS





- Scale


Resting Pulse: 0= [place] 80 or Below


Sweatin= Chills/Flushing


Restless Observation: 1= Difficult to Sit Still


Pupil Size: 0= Normal to Room Light


Bone or Joint Aches: 1= Mild Discomfort


Runny Nose/ Eye Tearin= Nasal Congestion


GI Upset > 30mins: 1= Stomach Cramp


Tremor Observation of Outstretched Hands: 1= Tremor Felt, Not Seen


Yawning Observation: 1= 1-2x During Session


Anxiety or Irritability: 1=Feels Anxious/Irritable


Goose Flesh Skin: 0=Smooth Skin


COWS Score: 8





BHS Progress Note (SOAP)


Subjective: 





sweat tremor restlessness anxiety body aches trouble sleep at night


Objective: 





18 09:50


 Vital Signs











Temperature  97.0 F L  18 09:15


 


Pulse Rate  87   18 09:15


 


Respiratory Rate  18   18 09:15


 


Blood Pressure  123/74   18 09:15


 


O2 Sat by Pulse Oximetry (%)      








 Laboratory Last Values











WBC  6.9 K/mm3 (4.0-10.0)   18  07:40    


 


RBC  4.76 M/mm3 (4.00-5.60)   18  07:40    


 


Hgb  14.0 GM/dL (11.7-16.9)   18  07:40    


 


Hct  43.4 % (35.4-49)   18  07:40    


 


MCV  91.2 fl (80-96)   18  07:40    


 


MCH  29.4 pg (25.7-33.7)   18  07:40    


 


MCHC  32.3 g/dl (32.0-35.9)   18  07:40    


 


RDW  15.1 % (11.9-15.9)   18  07:40    


 


Plt Count  333 K/MM3 (134-434)   18  07:40    


 


MPV  8.3 fl (7.5-11.1)   18  07:40    


 


Sodium  141 mmol/L (136-145)   18  07:40    


 


Potassium  4.4 mmol/L (3.5-5.1)   18  07:40    


 


Chloride  108 mmol/L ()  H  18  07:40    


 


Carbon Dioxide  26 mmol/L (21-32)   18  07:40    


 


Anion Gap  8 MMOL/L (8-16)   18  07:40    


 


BUN  16 mg/dL (7-18)   18  07:40    


 


Creatinine  0.8 mg/dL (0.55-1.3)   18  07:40    


 


Creat Clearance w eGFR  > 60  (>60)   18  07:40    


 


Random Glucose  84 mg/dL ()   18  07:40    


 


Calcium  8.4 mg/dL (8.5-10.1)  L  18  07:40    


 


Total Bilirubin  0.2 mg/dL (0.2-1)   18  07:40    


 


AST  16 U/L (15-37)   18  07:40    


 


ALT  16 U/L (13-61)   18  07:40    


 


Alkaline Phosphatase  68 U/L ()   18  07:40    


 


Total Protein  6.0 g/dl (6.4-8.2)  L  18  07:40    


 


Albumin  3.0 g/dl (3.4-5.0)  L  18  07:40    


 


RPR Titer  Nonreactive  (NONREACTIVE)   18  07:40    








lab noted


Assessment: 





18 09:51


withdrawal sx


Plan: 





continue detox

## 2018-11-20 VITALS — DIASTOLIC BLOOD PRESSURE: 86 MMHG | HEART RATE: 92 BPM | SYSTOLIC BLOOD PRESSURE: 134 MMHG

## 2018-11-20 VITALS — TEMPERATURE: 98.2 F

## 2018-11-20 NOTE — PN
BHS Progress Note (SOAP)


Subjective: 





sweat tremor restlessness trouble sleep at night body aches joints pain


Objective: 





11/20/18 10:05


 Vital Signs











Temperature  98.2 F   11/20/18 09:34


 


Pulse Rate  92 H  11/20/18 09:34


 


Respiratory Rate  18   11/20/18 09:34


 


Blood Pressure  134/86   11/20/18 09:34


 


O2 Sat by Pulse Oximetry (%)      








 Laboratory Last Values











WBC  6.9 K/mm3 (4.0-10.0)   11/18/18  07:40    


 


RBC  4.76 M/mm3 (4.00-5.60)   11/18/18  07:40    


 


Hgb  14.0 GM/dL (11.7-16.9)   11/18/18  07:40    


 


Hct  43.4 % (35.4-49)   11/18/18  07:40    


 


MCV  91.2 fl (80-96)   11/18/18  07:40    


 


MCH  29.4 pg (25.7-33.7)   11/18/18  07:40    


 


MCHC  32.3 g/dl (32.0-35.9)   11/18/18  07:40    


 


RDW  15.1 % (11.9-15.9)   11/18/18  07:40    


 


Plt Count  333 K/MM3 (134-434)   11/18/18  07:40    


 


MPV  8.3 fl (7.5-11.1)   11/18/18  07:40    


 


Sodium  141 mmol/L (136-145)   11/18/18  07:40    


 


Potassium  4.4 mmol/L (3.5-5.1)   11/18/18  07:40    


 


Chloride  108 mmol/L ()  H  11/18/18  07:40    


 


Carbon Dioxide  26 mmol/L (21-32)   11/18/18  07:40    


 


Anion Gap  8 MMOL/L (8-16)   11/18/18  07:40    


 


BUN  16 mg/dL (7-18)   11/18/18  07:40    


 


Creatinine  0.8 mg/dL (0.55-1.3)   11/18/18  07:40    


 


Creat Clearance w eGFR  > 60  (>60)   11/18/18  07:40    


 


Random Glucose  84 mg/dL ()   11/18/18  07:40    


 


Calcium  8.4 mg/dL (8.5-10.1)  L  11/18/18  07:40    


 


Total Bilirubin  0.2 mg/dL (0.2-1)   11/18/18  07:40    


 


AST  16 U/L (15-37)   11/18/18  07:40    


 


ALT  16 U/L (13-61)   11/18/18  07:40    


 


Alkaline Phosphatase  68 U/L ()   11/18/18  07:40    


 


Total Protein  6.0 g/dl (6.4-8.2)  L  11/18/18  07:40    


 


Albumin  3.0 g/dl (3.4-5.0)  L  11/18/18  07:40    


 


Urine Color  Colorless   11/19/18  09:50    


 


Urine Appearance  Clear   11/19/18  09:50    


 


Urine pH  7.0  (5.0-8.0)  D 11/19/18  09:50    


 


Ur Specific Gravity  1.003  (1.010-1.035)  L  11/19/18  09:50    


 


Urine Protein  Negative  (NEGATIVE)   11/19/18  09:50    


 


Urine Glucose (UA)  Negative  (NEGATIVE)   11/19/18  09:50    


 


Urine Ketones  Negative  (NEGATIVE)   11/19/18  09:50    


 


Urine Blood  Negative  (NEGATIVE)   11/19/18  09:50    


 


Urine Nitrite  Negative  (NEGATIVE)   11/19/18  09:50    


 


Urine Bilirubin  Negative  (<2.0 mg/dL)   11/19/18  09:50    


 


Urine Urobilinogen  Negative mg/dL (0.2-1.0)   11/19/18  09:50    


 


Ur Leukocyte Esterase  Negative  (NEGATIVE)   11/19/18  09:50    


 


RPR Titer  Nonreactive  (NONREACTIVE)   11/18/18  07:40    








lab noted


Assessment: 





11/20/18 10:05


withdrawal sx


Plan: 





continue detox

## 2018-11-20 NOTE — DS
[facility] Detox Discharge Summary


Admission Date: 


11/17/18





Discharge Date: 11/20/18





- History


Present History: Alcohol Dependence, Opioid Dependence


Additional Comments: 





53 years old male admitted on 11/17/18 for alcohol and opiate withdrawal sx


insists to leave the detox unit today without apparent reason


alert oriented x 3 no acute distress denies suicidal denies homocidal no self 

destructive behavior


after[facility]


Pertinent Past History: 





patient agrees to consider in patient rehab [facility] Physical Exam Results


Vital Signs: 


 Vital Signs











Temperature  98.2 F   11/20/18 09:34


 


Pulse Rate  92 H  11/20/18 09:34


 


Respiratory Rate  18   11/20/18 09:34


 


Blood Pressure  134/86   11/20/18 09:34


 


O2 Sat by Pulse Oximetry (%)      











Pertinent Admission Physical Exam Findings: 





alcohol and opiate withdrawal sx


 Vital Signs











Temperature  98.2 F   11/20/18 09:34


 


Pulse Rate  92 H  11/20/18 09:34


 


Respiratory Rate  18   11/20/18 09:34


 


Blood Pressure  134/86   11/20/18 09:34


 


O2 Sat by Pulse Oximetry (%)      








 Laboratory Last Values











WBC  6.9 K/mm3 (4.0-10.0)   11/18/18  07:40    


 


RBC  4.76 M/mm3 (4.00-5.60)   11/18/18  07:40    


 


Hgb  14.0 GM/dL (11.7-16.9)   11/18/18  07:40    


 


Hct  43.4 % (35.4-49)   11/18/18  07:40    


 


MCV  91.2 fl (80-96)   11/18/18  07:40    


 


MCH  29.4 pg (25.7-33.7)   11/18/18  07:40    


 


MCHC  32.3 g/dl (32.0-35.9)   11/18/18  07:40    


 


RDW  15.1 % (11.9-15.9)   11/18/18  07:40    


 


Plt Count  333 K/MM3 (134-434)   11/18/18  07:40    


 


MPV  8.3 fl (7.5-11.1)   11/18/18  07:40    


 


Sodium  141 mmol/L (136-145)   11/18/18  07:40    


 


Potassium  4.4 mmol/L (3.5-5.1)   11/18/18  07:40    


 


Chloride  108 mmol/L ()  H  11/18/18  07:40    


 


Carbon Dioxide  26 mmol/L (21-32)   11/18/18  07:40    


 


Anion Gap  8 MMOL/L (8-16)   11/18/18  07:40    


 


BUN  16 mg/dL (7-18)   11/18/18  07:40    


 


Creatinine  0.8 mg/dL (0.55-1.3)   11/18/18  07:40    


 


Creat Clearance w eGFR  > 60  (>60)   11/18/18  07:40    


 


Random Glucose  84 mg/dL ()   11/18/18  07:40    


 


Calcium  8.4 mg/dL (8.5-10.1)  L  11/18/18  07:40    


 


Total Bilirubin  0.2 mg/dL (0.2-1)   11/18/18  07:40    


 


AST  16 U/L (15-37)   11/18/18  07:40    


 


ALT  16 U/L (13-61)   11/18/18  07:40    


 


Alkaline Phosphatase  68 U/L ()   11/18/18  07:40    


 


Total Protein  6.0 g/dl (6.4-8.2)  L  11/18/18  07:40    


 


Albumin  3.0 g/dl (3.4-5.0)  L  11/18/18  07:40    


 


Urine Color  Colorless   11/19/18  09:50    


 


Urine Appearance  Clear   11/19/18  09:50    


 


Urine pH  7.0  (5.0-8.0)  D 11/19/18  09:50    


 


Ur Specific Gravity  1.003  (1.010-1.035)  L  11/19/18  09:50    


 


Urine Protein  Negative  (NEGATIVE)   11/19/18  09:50    


 


Urine Glucose (UA)  Negative  (NEGATIVE)   11/19/18  09:50    


 


Urine Ketones  Negative  (NEGATIVE)   11/19/18  09:50    


 


Urine Blood  Negative  (NEGATIVE)   11/19/18  09:50    


 


Urine Nitrite  Negative  (NEGATIVE)   11/19/18  09:50    


 


Urine Bilirubin  Negative  (<2.0 mg/dL)   11/19/18  09:50    


 


Urine Urobilinogen  Negative mg/dL (0.2-1.0)   11/19/18  09:50    


 


Ur Leukocyte Esterase  Negative  (NEGATIVE)   11/19/18  09:50    


 


RPR Titer  Nonreactive  (NONREACTIVE)   11/18/18  07:40    








lab noted





- Treatment


Hospital Course: Detox Protocol Followed, Responded well


Patient has Accepted [facility]hab Referral to: [facility]





- Medication


Discharge Medications: 


Ambulatory Orders





NK [No Known Home Medication]  09/08/18 











- Diagnosis


(1) Alcohol dependence with uncomplicated withdrawal


Current Visit: Yes   Status: Acute   





(2) Opioid dependence with withdrawal


Current Visit: Yes   Status: Acute   





(3) Nicotine dependence


Current Visit: Yes   Status: Acute   


Qualifiers: 


   Nicotine product type: cigarettes   Substance use status: in withdrawal   

Qualified Code(s): F17.213 - Nicotine dependence, cigarettes, with withdrawal   





(4) Underweight


Current Visit: Yes   Status: Acute   





- AMA


Did Patient Leave Against Medical Advice: Yes

## 2019-07-31 ENCOUNTER — HOSPITAL ENCOUNTER (INPATIENT)
Dept: HOSPITAL 74 - Y6N | Age: 54
LOS: 5 days | Discharge: HOME | End: 2019-08-05
Payer: COMMERCIAL

## 2019-07-31 DIAGNOSIS — K08.9: ICD-10-CM

## 2019-07-31 DIAGNOSIS — R63.6: ICD-10-CM

## 2019-07-31 DIAGNOSIS — F11.23: Primary | ICD-10-CM

## 2019-07-31 DIAGNOSIS — F10.230: ICD-10-CM

## 2019-07-31 DIAGNOSIS — F17.210: ICD-10-CM

## 2019-07-31 DIAGNOSIS — F19.282: ICD-10-CM

## 2019-07-31 PROCEDURE — HZ2ZZZZ DETOXIFICATION SERVICES FOR SUBSTANCE ABUSE TREATMENT: ICD-10-PCS

## 2022-11-28 ENCOUNTER — HOSPITAL ENCOUNTER (INPATIENT)
Dept: HOSPITAL 74 - YASAS | Age: 57
LOS: 3 days | Discharge: LEFT BEFORE DISCHARGE | DRG: 770 | End: 2022-12-01
Attending: SURGERY | Admitting: ALLERGY & IMMUNOLOGY
Payer: COMMERCIAL

## 2022-11-28 VITALS — BODY MASS INDEX: 17.5 KG/M2

## 2022-11-28 DIAGNOSIS — F17.210: ICD-10-CM

## 2022-11-28 DIAGNOSIS — R63.4: ICD-10-CM

## 2022-11-28 DIAGNOSIS — F11.23: Primary | ICD-10-CM

## 2022-11-28 DIAGNOSIS — Z91.013: ICD-10-CM

## 2022-11-28 DIAGNOSIS — F19.24: ICD-10-CM

## 2022-11-28 DIAGNOSIS — K08.89: ICD-10-CM

## 2022-11-28 DIAGNOSIS — F19.282: ICD-10-CM

## 2022-11-28 LAB
APPEARANCE UR: (no result)
BILIRUB UR STRIP.AUTO-MCNC: NEGATIVE MG/DL
COLOR UR: (no result)
KETONES UR QL STRIP: (no result)
LEUKOCYTE ESTERASE UR QL STRIP.AUTO: NEGATIVE
NITRITE UR QL STRIP: NEGATIVE
PH UR: 5 [PH] (ref 5–8)
PROT UR QL STRIP: NEGATIVE
PROT UR QL STRIP: NEGATIVE
SP GR UR: 1.03 (ref 1.01–1.03)
UROBILINOGEN UR STRIP-MCNC: 0.2 MG/DL (ref 0.2–1)

## 2022-11-28 PROCEDURE — U0003 INFECTIOUS AGENT DETECTION BY NUCLEIC ACID (DNA OR RNA); SEVERE ACUTE RESPIRATORY SYNDROME CORONAVIRUS 2 (SARS-COV-2) (CORONAVIRUS DISEASE [COVID-19]), AMPLIFIED PROBE TECHNIQUE, MAKING USE OF HIGH THROUGHPUT TECHNOLOGIES AS DESCRIBED BY CMS-2020-01-R: HCPCS

## 2022-11-28 PROCEDURE — HZ2ZZZZ DETOXIFICATION SERVICES FOR SUBSTANCE ABUSE TREATMENT: ICD-10-PCS | Performed by: SURGERY

## 2022-11-28 PROCEDURE — U0005 INFEC AGEN DETEC AMPLI PROBE: HCPCS

## 2022-11-28 RX ADMIN — Medication SCH MG: at 22:27

## 2022-11-28 RX ADMIN — HYDROXYZINE PAMOATE PRN MG: 25 CAPSULE ORAL at 17:23

## 2022-11-28 RX ADMIN — POLYVINYL ALCOHOL SCH: 14 SOLUTION/ DROPS OPHTHALMIC at 22:25

## 2022-11-28 RX ADMIN — METHOCARBAMOL PRN MG: 500 TABLET ORAL at 22:27

## 2022-11-28 RX ADMIN — HYDROXYZINE PAMOATE PRN MG: 25 CAPSULE ORAL at 22:27

## 2022-11-29 LAB
ALBUMIN SERPL-MCNC: 2.8 G/DL (ref 3.4–5)
ALP SERPL-CCNC: 62 U/L (ref 45–117)
ALT SERPL-CCNC: 17 U/L (ref 13–61)
ANION GAP SERPL CALC-SCNC: 5 MMOL/L (ref 8–16)
AST SERPL-CCNC: 16 U/L (ref 15–37)
BILIRUB SERPL-MCNC: 0.3 MG/DL (ref 0.2–1)
BUN SERPL-MCNC: 22.1 MG/DL (ref 7–18)
CALCIUM SERPL-MCNC: 8.4 MG/DL (ref 8.5–10.1)
CHLORIDE SERPL-SCNC: 108 MMOL/L (ref 98–107)
CO2 SERPL-SCNC: 29 MMOL/L (ref 21–32)
CREAT SERPL-MCNC: 1 MG/DL (ref 0.55–1.3)
DEPRECATED RDW RBC AUTO: 14.5 % (ref 11.9–15.9)
GLUCOSE SERPL-MCNC: 86 MG/DL (ref 74–106)
HCT VFR BLD CALC: 38.8 % (ref 35.4–49)
HGB BLD-MCNC: 12.7 GM/DL (ref 11.7–16.9)
MCH RBC QN AUTO: 28.9 PG (ref 25.7–33.7)
MCHC RBC AUTO-ENTMCNC: 32.6 G/DL (ref 32–35.9)
MCV RBC: 88.4 FL (ref 80–96)
PLATELET # BLD AUTO: 287 10^3/UL (ref 134–434)
PMV BLD: 8 FL (ref 7.5–11.1)
PROT SERPL-MCNC: 5.7 G/DL (ref 6.4–8.2)
RBC # BLD AUTO: 4.39 M/MM3 (ref 4–5.6)
SODIUM SERPL-SCNC: 142 MMOL/L (ref 136–145)
WBC # BLD AUTO: 8.6 K/MM3 (ref 4–10)

## 2022-11-29 RX ADMIN — Medication SCH TAB: at 09:38

## 2022-11-29 RX ADMIN — Medication SCH MG: at 21:33

## 2022-11-29 RX ADMIN — POLYVINYL ALCOHOL SCH: 14 SOLUTION/ DROPS OPHTHALMIC at 21:36

## 2022-11-29 RX ADMIN — POLYVINYL ALCOHOL SCH DROP: 14 SOLUTION/ DROPS OPHTHALMIC at 09:37

## 2022-11-29 RX ADMIN — NICOTINE SCH: 21 PATCH TRANSDERMAL at 09:41

## 2022-11-30 VITALS — RESPIRATION RATE: 17 BRPM

## 2022-11-30 VITALS — DIASTOLIC BLOOD PRESSURE: 76 MMHG | SYSTOLIC BLOOD PRESSURE: 121 MMHG | HEART RATE: 69 BPM | TEMPERATURE: 97.7 F

## 2022-11-30 RX ADMIN — METHOCARBAMOL PRN MG: 500 TABLET ORAL at 18:11

## 2022-11-30 RX ADMIN — METHOCARBAMOL PRN MG: 500 TABLET ORAL at 07:31

## 2022-11-30 RX ADMIN — NICOTINE SCH: 21 PATCH TRANSDERMAL at 09:46

## 2022-11-30 RX ADMIN — POLYVINYL ALCOHOL SCH: 14 SOLUTION/ DROPS OPHTHALMIC at 09:47

## 2022-11-30 RX ADMIN — HYDROXYZINE PAMOATE PRN MG: 25 CAPSULE ORAL at 18:11

## 2022-11-30 RX ADMIN — Medication SCH MG: at 22:05

## 2022-11-30 RX ADMIN — Medication SCH TAB: at 09:46

## 2022-11-30 RX ADMIN — HYDROXYZINE PAMOATE PRN MG: 25 CAPSULE ORAL at 22:05

## 2022-11-30 RX ADMIN — POLYVINYL ALCOHOL SCH: 14 SOLUTION/ DROPS OPHTHALMIC at 22:03
